# Patient Record
Sex: FEMALE | Race: WHITE
[De-identification: names, ages, dates, MRNs, and addresses within clinical notes are randomized per-mention and may not be internally consistent; named-entity substitution may affect disease eponyms.]

---

## 2018-12-13 ENCOUNTER — HOSPITAL ENCOUNTER (EMERGENCY)
Dept: HOSPITAL 88 - ER | Age: 38
Discharge: HOME | End: 2018-12-13
Payer: COMMERCIAL

## 2018-12-13 VITALS — WEIGHT: 160 LBS | HEIGHT: 66 IN | BODY MASS INDEX: 25.71 KG/M2

## 2018-12-13 VITALS — SYSTOLIC BLOOD PRESSURE: 124 MMHG | DIASTOLIC BLOOD PRESSURE: 94 MMHG

## 2018-12-13 DIAGNOSIS — R05: ICD-10-CM

## 2018-12-13 DIAGNOSIS — J20.9: ICD-10-CM

## 2018-12-13 DIAGNOSIS — R07.89: Primary | ICD-10-CM

## 2018-12-13 DIAGNOSIS — R11.2: ICD-10-CM

## 2018-12-13 LAB
ALBUMIN SERPL-MCNC: 3.7 G/DL (ref 3.5–5)
ALBUMIN/GLOB SERPL: 1 {RATIO} (ref 0.8–2)
ALP SERPL-CCNC: 63 IU/L (ref 40–150)
ALT SERPL-CCNC: 24 IU/L (ref 0–55)
ANION GAP SERPL CALC-SCNC: 13.9 MMOL/L (ref 8–16)
BACTERIA URNS QL MICRO: (no result) /HPF
BASOPHILS # BLD AUTO: 0.1 10*3/UL (ref 0–0.1)
BASOPHILS NFR BLD AUTO: 0.6 % (ref 0–1)
BILIRUB UR QL: NEGATIVE
BUN SERPL-MCNC: 13 MG/DL (ref 7–26)
BUN/CREAT SERPL: 13 (ref 6–25)
CALCIUM SERPL-MCNC: 9.2 MG/DL (ref 8.4–10.2)
CHLORIDE SERPL-SCNC: 102 MMOL/L (ref 98–107)
CK MB SERPL-MCNC: 0.8 NG/ML (ref 0–5)
CK SERPL-CCNC: 51 IU/L (ref 29–168)
CLARITY UR: CLEAR
CO2 SERPL-SCNC: 25 MMOL/L (ref 22–29)
COLOR UR: YELLOW
DEPRECATED APTT PLAS QN: 25.5 SECONDS (ref 23.8–35.5)
DEPRECATED INR PLAS: 0.83
DEPRECATED NEUTROPHILS # BLD AUTO: 7.4 10*3/UL (ref 2.1–6.9)
DEPRECATED RBC URNS MANUAL-ACNC: (no result) /HPF (ref 0–5)
EGFRCR SERPLBLD CKD-EPI 2021: 60 ML/MIN (ref 60–?)
EOSINOPHIL # BLD AUTO: 0.3 10*3/UL (ref 0–0.4)
EOSINOPHIL NFR BLD AUTO: 2.5 % (ref 0–6)
EPI CELLS URNS QL MICRO: (no result) /LPF
ERYTHROCYTE [DISTWIDTH] IN CORD BLOOD: 13.8 % (ref 11.7–14.4)
GLOBULIN PLAS-MCNC: 3.6 G/DL (ref 2.3–3.5)
GLUCOSE SERPLBLD-MCNC: 97 MG/DL (ref 74–118)
HCG UR QL: NEGATIVE
HCT VFR BLD AUTO: 42.9 % (ref 34.2–44.1)
HGB BLD-MCNC: 14.6 G/DL (ref 12–16)
KETONES UR QL STRIP.AUTO: NEGATIVE
LEUKOCYTE ESTERASE UR QL STRIP.AUTO: NEGATIVE
LYMPHOCYTES # BLD: 3.5 10*3/UL (ref 1–3.2)
LYMPHOCYTES NFR BLD AUTO: 28.8 % (ref 18–39.1)
MCH RBC QN AUTO: 32.1 PG (ref 28–32)
MCHC RBC AUTO-ENTMCNC: 34 G/DL (ref 31–35)
MCV RBC AUTO: 94.3 FL (ref 81–99)
MONOCYTES # BLD AUTO: 0.9 10*3/UL (ref 0.2–0.8)
MONOCYTES NFR BLD AUTO: 7.5 % (ref 4.4–11.3)
NEUTS SEG NFR BLD AUTO: 60.4 % (ref 38.7–80)
NITRITE UR QL STRIP.AUTO: NEGATIVE
PLATELET # BLD AUTO: 290 X10E3/UL (ref 140–360)
POTASSIUM SERPL-SCNC: 3.9 MMOL/L (ref 3.5–5.1)
PROT UR QL STRIP.AUTO: NEGATIVE
PROTHROMBIN TIME: 12.2 SECONDS (ref 11.9–14.5)
RBC # BLD AUTO: 4.55 X10E6/UL (ref 3.6–5.1)
SODIUM SERPL-SCNC: 137 MMOL/L (ref 136–145)
SP GR UR STRIP: 1.02 (ref 1.01–1.02)
UROBILINOGEN UR STRIP-MCNC: 0.2 MG/DL (ref 0.2–1)
WBC #/AREA URNS HPF: (no result) /HPF (ref 0–5)

## 2018-12-13 PROCEDURE — 84484 ASSAY OF TROPONIN QUANT: CPT

## 2018-12-13 PROCEDURE — 81025 URINE PREGNANCY TEST: CPT

## 2018-12-13 PROCEDURE — 93005 ELECTROCARDIOGRAM TRACING: CPT

## 2018-12-13 PROCEDURE — 99284 EMERGENCY DEPT VISIT MOD MDM: CPT

## 2018-12-13 PROCEDURE — 36415 COLL VENOUS BLD VENIPUNCTURE: CPT

## 2018-12-13 PROCEDURE — 80053 COMPREHEN METABOLIC PANEL: CPT

## 2018-12-13 PROCEDURE — 82550 ASSAY OF CK (CPK): CPT

## 2018-12-13 PROCEDURE — 85025 COMPLETE CBC W/AUTO DIFF WBC: CPT

## 2018-12-13 PROCEDURE — 85610 PROTHROMBIN TIME: CPT

## 2018-12-13 PROCEDURE — 81001 URINALYSIS AUTO W/SCOPE: CPT

## 2018-12-13 PROCEDURE — 94640 AIRWAY INHALATION TREATMENT: CPT

## 2018-12-13 PROCEDURE — 85730 THROMBOPLASTIN TIME PARTIAL: CPT

## 2018-12-13 PROCEDURE — 71046 X-RAY EXAM CHEST 2 VIEWS: CPT

## 2018-12-13 PROCEDURE — 82553 CREATINE MB FRACTION: CPT

## 2018-12-13 NOTE — XMS REPORT
Continuity of Care Document

                             Created on: 02/10/2017



AYLEEN LNIDER

External Reference #: [id number]

: 1980

Sex: Female



Demographics







                          Address                   [address] 

[place]  [address]

 

                          Home Phone                [phone]

 

                          Preferred Language        Unknown

 

                          Marital Status            Unknown

 

                          Protestant Affiliation     Unknown

 

                          Race                      Unknown

 

                          Ethnic Group              Unknown





Author







                          Author                    [facility]              Interface

 

                          Address                   Unknown

 

                          Phone                     Unavailable



                                                    



Problems

                    





                    Problem                            Status                            Onset Date     

                          Classification                            Date Reported       

                          Comments                            Source                    

 

                    ABSCESS                            Active                            2014     

                          Condition                            2014               

                                                       Medical Group                    

 

                    HIDRADENITIS                            Active                            2014

                            Condition                            2014          

                                                       Medical Group                    



                                                                                
                       



Medications

                    





                    Medication                            Details                            Route      

                          Status                            Patient Instructions         

                          Ordering Provider                            Order Date           

                                        Source                    

 

                          DOXYCYCLINE HYCLATE 100 MG CAPS                            Take one capsule by mouth

 once daily                                                        Active            

                                                                            2014     

                                         Medical Group                    

 

                          CLINDAMYCIN  MG CAPS                            Take one capsule by mouth 3

 times a day                                                        No Longer Active 

                                                                                   2014

                                         Medical Group                    



                                                                                
                       



Allergies, Adverse Reactions, Alerts

                    





                    Substance                            Category                            Reaction   

                          Severity                            Reaction type           

                          Status                            Date Reported                     

                          Comments                            Source                    



                                                                



Immunizations

                    





                    Immunization                            Date Given                            Site  

                          Status                            Last Updated             

                          Comments                            Source                    



                                                                        



Results

                    





                    Order Name                            Results                            Value      

                          Reference Range                            Date                

                          Interpretation                            Comments                       

                                        Source                    



                                                                                



Vital Signs

                    





                    Vital Sign                            Value                            Date         

                          Comments                            Source                    

 

                    Weight                            172                             2014        

                                                       Medical Group                    

 

                    Temperature Oral (F)                            98.2 F                            2014

                                                         Medical Group             

       

 

                    Heart Rate                            77                             2014     

                                                       Medical Group                  

  

 

                    Systolic (mm Hg)                            108                             2014

                                                         Medical Group             

       

 

                    Diastolic (mm Hg)                            72                             2014

                                                         Medical Group             

       

 

                    Weight                            170                             2014        

                                                       Medical Group                    

 

                    Temperature Oral (F)                            98.8 F                            2014

                                                         Medical Group             

       

 

                    Heart Rate                            103                             2014    

                                                       Medical Group                 

   

 

                    Systolic (mm Hg)                            110                             2014

                                                         Medical Group             

       

 

                    Diastolic (mm Hg)                            77                             2014

                                                         Medical Group             

       

 

                    Height                            66                             2014         

                                                       Medical Group                    

 

                    Weight                            169                             2014        

                                                       Medical Group                    

 

                    Temperature Oral (F)                            98.0 F                            2014

                                                         Medical Group             

       

 

                    Heart Rate                            97                             2014     

                                                       Medical Group                  

  

 

                    Systolic (mm Hg)                            105                             2014

                                                         Medical Group             

       

 

                    Diastolic (mm Hg)                            73                             2014

                                                         Medical Group             

       



                                                                                
                                                                                
                                                                                
                                                                                
    



Encounters

                    





                    Location                            Location Details                            Encounter

 Type                            Encounter Number                            Reason For

 Visit                            Attending Provider                            ADM Date

                            DC Date                            Status                

                                        Source                    

 

                          [facility]                             

                           Lab Report                            [id number]    

                                                      Srinivasan Burris MD                  

                    2014                            

                                        [facility]                             

                           Office Visit                            [id number]  

                                                      Srinivasan Burris MD                

                          2014                        

                                                      [facility]                             

                           Office Visit                            [id number]  

                                                      Srinivasan Burris MD                

                          2014                        

                                                      [facility]                    

 

                                                                            Outpatient                  

                    [id number]                                                        [facility]

 VOLOYIANNIS                             2016                                  

                          Active                            [facility]             

       

 

                                                                            Outpatient                  

                    [id number]                                                        [facility]

 VOLOYIANNIS                             2016                                  

                          Active                            [facility]             

       

 

                                                                            Outpatient                  

                    [id number]                                                        [facility]

 VOLOYIANNIS                             09/15/2016                                  

                          Active                            [facility]             

       

 

                                                                            Outpatient                  

                    [id number]                                                        [facility]

 VOLOYIANNIS                             09/15/2016                                  

                          Active                            [facility]             

       

 

                                                                            Outpatient                  

                    [id number]                                                        [facility]

 VOLOYIANNIS                             2016                                  

                          Active                            [facility]             

       

 

                                                                            Outpatient                  

                    [id number]                                                        [facility]

 VOLOYIANNIS                             2016                                  

                          Active                            [facility]             

       

 

                                                                            Outpatient                  

                    [id number]                                                        [facility]

 VOLOYIANNIS                             2017                                  

                          Active                            [facility]             

       

 

                                                                            Outpatient                  

                    [id number]                                                        [facility]

 VOLOYIANNIS                             02/10/2017                                  

                          Active                            [facility]             

       



                                                                                
                                                                                
                                          



Procedures

                    





                    Procedure                            Code                            Date           

                          Perfomer                            Comments                        

                                        Source                    

 

                                        smoking/tobacco cessation, patient education and counseling                     

                    2014                                        

                          yes                            [facility]                    

 

                                        smoking/tobacco cessation, patient education and counseling                     

                    2014                                        

                          yes                            [facility]

## 2018-12-13 NOTE — XMS REPORT
Continuity of Care Document

                             Created on: 2014



AYLEEN LINDER

External Reference #: [id number]

: 1980

Sex: Female



Demographics







                          Address                   [address] [place]  [address]

 

                          Home Phone                [phone]

 

                          Preferred Language        Unknown

 

                          Marital Status            Never 

 

                          Islam Affiliation     Unknown

 

                          Race                      Unknown

 

                          Ethnic Group              Unknown





Author







                          Author                    [facility]

 

                          Organization              [facility]

 

                          Address                   Unknown

 

                          Phone                     Unavailable







Care Team Providers







                    Care Team Member Name    Role                Phone

 

                    MD Dayton, Srinivasan MADSEN                  Unavailable



                                                                                
                             



Insurance Providers

              





             Payer name    Policy type / Coverage type    Policy ID    Covered party ID    Policy [employer]



 

             [facility]                                            



                                                                                
                     



Encounters

          





                Encounter       Performer       Location        Date

 

                    Lab Report          Srinivasan Burris MD     [facility]

                                        2014



                                                                                
               



Problems

          





                    Problem             Effective Dates     Problem Status

 

                    HIDRADENITIS        Mar 31, 2014        Active

 

                    ABSCESS             2014        Active



                                                                                
                         



Procedures

          





                    Date                Description         Comments

 

                    Mar 31, 2014        smoking status      current every day smoker

 

                    Mar 31, 2014        smoking/tobacco cessation, patient education and counseling    yes

 

                    2014        smoking/tobacco cessation, patient education and counseling    yes

 

                    2014        smoking status      current every day smoker



                                                                                
               



Medications

          





                Medication      Instructions    Start Date      Status

 

                    CLINDAMYCIN  MG CAPS    Take one capsule by mouth 3 times a day    2014

                                        Inactive



                                                                                
                         



Vital Signs

          





                Date            Description     Test            Result

 

                Mar 31, 2014    height E&M - 8302-2    HEIGHT          66 in

 

                Mar 31, 2014    weight E&M - 3141-9    WEIGHT          169 lb

 

                Mar 31, 2014    temperature E&M    TEMPERATURE     98.0 deg f

 

                Mar 31, 2014    pulse rate E&M - 8867-4    PULSE RATE      97 /min

 

                Mar 31, 2014    blood pressure, systolic - 8480-6    BP SYSTOLIC     105 mm Hg

 

                Mar 31, 2014    blood pressure, diastolic - 8462-4    BP DIASTOLIC    73 mm Hg

 

                2014    weight E&M - 3141-9    WEIGHT          170 lb

 

                2014    temperature E&M    TEMPERATURE     98.8 deg f

 

                2014    pulse rate E&M - 8867-4    PULSE RATE      103 /min

 

                2014    blood pressure, systolic - 8480-6    BP SYSTOLIC     110 mm Hg

 

                2014    blood pressure, diastolic - 8462-4    BP DIASTOLIC    77 mm Hg

 

                2014    weight E&M - 3141-9    WEIGHT          172 lb

 

                2014    temperature E&M    TEMPERATURE     98.2 deg f

 

                2014    pulse rate E&M - 8867-4    PULSE RATE      77 /min

 

                2014    blood pressure, systolic - 8480-6    BP SYSTOLIC     108 mm Hg

 

                2014    blood pressure, diastolic - 8462-4    BP DIASTOLIC    72 mm Hg

## 2018-12-13 NOTE — DIAGNOSTIC IMAGING REPORT
EXAMINATION: PA and lateral views of the chest.



COMPARISON: None



CLINICAL HISTORY:  Cough, chest pain

     

DISCUSSION:



The lungs are well expanded. No focal airspace consolidation, pleural effusion,

or pneumothorax. Cardiomediastinal contour and pulmonary vasculature are within

normal limits.



No acute osseous abnormality.



IMPRESSION: 

No acute cardiopulmonary abnormalities.











Signed by: Dr. Torres Luz M.D. on 12/13/2018 2:28 PM

## 2018-12-13 NOTE — XMS REPORT
Continuity of Care Document

                             Created on: 2014



AYLEEN LINDER

External Reference #: [id number]

: 1980

Sex: Female



Demographics







                          Address                   [address] [place]  [address]

 

                          Home Phone                [phone]

 

                          Preferred Language        Unknown

 

                          Marital Status            Never 

 

                          Confucianism Affiliation     Unknown

 

                          Race                      Unknown

 

                          Ethnic Group              Unknown





Author







                          Author                    [facility]

 

                          Organization              [facility]

 

                          Address                   Unknown

 

                          Phone                     Unavailable







Care Team Providers







                    Care Team Member Name    Role                Phone

 

                    MD [place], Srinivasan MADSEN                  Unavailable



                                                                                
                             



Insurance Providers

              





             Payer name    Policy type / Coverage type    Policy ID    Covered party ID    Policy [employer]



 

             [facility]                                            



                                                                                
                     



Encounters

          





                Encounter       Performer       Location        Date

 

                    Office Visit        Srinivasan Burris MD     [facility]

 [address]                                    2014



                                                                                
               



Problems

          





                    Problem             Effective Dates     Problem Status

 

                    HIDRADENITIS        Mar 31, 2014        Active

 

                    ABSCESS             2014        Active



                                                                                
                         



Procedures

          





                    Date                Description         Comments

 

                    Mar 31, 2014        smoking status      current every day smoker

 

                    Mar 31, 2014        smoking/tobacco cessation, patient education and counseling    yes

 

                    2014        smoking/tobacco cessation, patient education and counseling    yes

 

                    2014        smoking status      current every day smoker



                                                                                
               



Medications

          





                Medication      Instructions    Start Date      Status

 

                    CLINDAMYCIN  MG CAPS    Take one capsule by mouth 3 times a day    2014

                                        Active



                                                                                
                         



Vital Signs

          





                Date            Description     Test            Result

 

                Mar 31, 2014    height E&M - 8302-2    HEIGHT          66 in

 

                Mar 31, 2014    weight E&M - 3141-9    WEIGHT          169 lb

 

                Mar 31, 2014    temperature E&M    TEMPERATURE     98.0 deg f

 

                Mar 31, 2014    pulse rate E&M - 8867-4    PULSE RATE      97 /min

 

                Mar 31, 2014    blood pressure, systolic - 8480-6    BP SYSTOLIC     105 mm Hg

 

                Mar 31, 2014    blood pressure, diastolic - 8462-4    BP DIASTOLIC    73 mm Hg

 

                2014    weight E&M - 3141-9    WEIGHT          170 lb

 

                2014    temperature E&M    TEMPERATURE     98.8 deg f

 

                2014    pulse rate E&M - 8867-4    PULSE RATE      103 /min

 

                2014    blood pressure, systolic - 8480-6    BP SYSTOLIC     110 mm Hg

 

                2014    blood pressure, diastolic - 8462-4    BP DIASTOLIC    77 mm Hg

## 2018-12-13 NOTE — XMS REPORT
Continuity of Care Document

                             Created on: 2014



AYLEEN LINDER

External Reference #: [id number]

: 1980

Sex: Female



Demographics







                          Address                   [address] [place]  [address]

 

                          Home Phone                [phone]

 

                          Preferred Language        Unknown

 

                          Marital Status            Never 

 

                          Latter day Affiliation     Unknown

 

                          Race                      Unknown

 

                          Ethnic Group              Unknown





Author







                          Author                    [facility]

 

                          Organization              [facility]

 

                          Address                   Unknown

 

                          Phone                     Unavailable







Care Team Providers







                    Care Team Member Name    Role                Phone

 

                    MD [place], Srinivasan MADSEN                  Unavailable



                                                                                
                             



Insurance Providers

              





             Payer name    Policy type / Coverage type    Policy ID    Covered party ID    Policy [place]



 

             [facility]                                            



                                                                                
                     



Encounters

          





                Encounter       Performer       Location        Date

 

                    Office Visit        Srinivasan Burris MD     [facility]

 [address]                                    2014



                                                                                
               



Problems

          





                    Problem             Effective Dates     Problem Status

 

                    HIDRADENITIS        Mar 31, 2014        Active

 

                    ABSCESS             2014        Active



                                                                                
                         



Procedures

          





                    Date                Description         Comments

 

                    Mar 31, 2014        smoking status      current every day smoker

 

                    Mar 31, 2014        smoking/tobacco cessation, patient education and counseling    yes

 

                    2014        smoking/tobacco cessation, patient education and counseling    yes

 

                    2014        smoking status      current every day smoker



                                                                                
               



Medications

          





                Medication      Instructions    Start Date      Status

 

                    CLINDAMYCIN  MG CAPS    Take one capsule by mouth 3 times a day    2014

                                        Inactive

 

                    DOXYCYCLINE HYCLATE 100 MG CAPS    Take one capsule by mouth once daily    2014

                                        Active



                                                                                
                         



Vital Signs

          





                Date            Description     Test            Result

 

                Mar 31, 2014    height E&M - 8302-2    HEIGHT          66 in

 

                Mar 31, 2014    weight E&M - 3141-9    WEIGHT          169 lb

 

                Mar 31, 2014    temperature E&M    TEMPERATURE     98.0 deg f

 

                Mar 31, 2014    pulse rate E&M - 8867-4    PULSE RATE      97 /min

 

                Mar 31, 2014    blood pressure, systolic - 8480-6    BP SYSTOLIC     105 mm Hg

 

                Mar 31, 2014    blood pressure, diastolic - 8462-4    BP DIASTOLIC    73 mm Hg

 

                2014    weight E&M - 3141-9    WEIGHT          170 lb

 

                2014    temperature E&M    TEMPERATURE     98.8 deg f

 

                2014    pulse rate E&M - 8867-4    PULSE RATE      103 /min

 

                2014    blood pressure, systolic - 8480-6    BP SYSTOLIC     110 mm Hg

 

                2014    blood pressure, diastolic - 8462-4    BP DIASTOLIC    77 mm Hg

 

                2014    weight E&M - 3141-9    WEIGHT          172 lb

 

                2014    temperature E&M    TEMPERATURE     98.2 deg f

 

                2014    pulse rate E&M - 8867-4    PULSE RATE      77 /min

 

                2014    blood pressure, systolic - 8480-6    BP SYSTOLIC     108 mm Hg

 

                2014    blood pressure, diastolic - 8462-4    BP DIASTOLIC    72 mm Hg